# Patient Record
(demographics unavailable — no encounter records)

---

## 2025-07-14 NOTE — HISTORY OF PRESENT ILLNESS
[de-identified] : followup for weight check [FreeTextEntry6] : Followup for weight check Combo (expressed breast milk and formula) feeds every 1-2 hours. takes 1 oz max. reports patient not interested in feeding on the breast, unsure if gulps when latches. mom denies pain or blood w feeding.  no increased projectile vomitting Vit D drops not started yet.  4-5 stools/ day yellow, no blood or acholic stools. at least 6 times xlast 24 hrs UOP  not interested in lactation consultant services today, offered multiple times. Reports improvement in sleepiness since yesterday's visit

## 2025-07-14 NOTE — DISCUSSION/SUMMARY
[FreeTextEntry1] : Pt is a 6 day old ex 39 weeker presenting for weight check. BW 3650 kg (7/8), DW 3520 kg (7/10), wt yesterday (7/13) 3290 kg, today wt 3360 kg (7/14). +70 g/day since yesterday visit, has not yet surpassed BW.  Feeding combination of expressed breast milk and formula q1-2h 1 oz. No vomitting or color change w feeds. Stooling and voiding appropriately. On exam, appropriate fussy but consolable to examination, no jaundice or sclerlal icterus, no clinical signs of dehydration, + known R medial subconjunctival hemorrhage likely 2/2 birth trauma, + diaper rash likely fungal given red appearance w satellite lesions.  Poor wt gain likely physiologic 2/2 decreased feeding frequency previously. Low suspicion for cardiac or infectious etiologies of poor wt gain at this time.  Plan: c/w frequent feeds (combo expressed milk + formula) at least q2h, at least 1 oz, reflux precautions. RTC Friday 7/18 for wt check since has not yet regained BW. Discussed no contraindication to maternal loperamide and breastfeeding per [facility]. Sent Rx for Vit D daily drops given roughly half feeding is breast milk. Not interested in lactation consultant.  For diaper rash, Nystatin 2-3 times/day x14 days or until symptom resolution for likely candidal diaper rash. Rx sent. Recommend frequent diaper changing and barrier cream also to prevent future skin irritation.

## 2025-07-14 NOTE — PHYSICAL EXAM
[No Acute Distress] : acute distress [Alert] : alert [Consolable] : consolable [Normocephalic] : normocephalic [Cerumen in canal] : cerumen in canal [Pink Nasal Mucosa] : pink nasal mucosa [Erythematous Oropharynx] : erythematous oropharynx [Supple] : supple [Clear to Auscultation Bilaterally] : clear to auscultation bilaterally [Regular Rate and Rhythm] : regular rate and rhythm [Soft] : soft [Drew: ____] : Drew [unfilled] [Normal External Genitalia] : normal external genitalia [Patent] : patent [No Abnormal Lymph Nodes Palpated] : no abnormal lymph nodes palpated [Moves All Extremities x 4] : moves all extremities x4 [Warm] : warm [Clear] : clear [Tired appearing] : not tired appearing [Lethargic] : not lethargic [Tender] : nontender [Distended] : nondistended [Sacral Dimple] : no sacral dimple [Tuft of Hair] : no tuft of hair [FreeTextEntry1] : appropriately fussy to examination [FreeTextEntry2] : ant fontanelle open and soft [FreeTextEntry5] : +R medial subconctival hemorrhage. no sclerlal icterus  [de-identified] : no Y shaped tongue, no tongue tie noted. +suck intact. unable to assess extension of tongue 2/2 pt cooperation [FreeTextEntry9] : +umbilical stump dry  [FreeTextEntry6] : beefy red perineum w b/l vertical 1 cm satelite lesions. +yellow seedy stool in diaper on exam [de-identified] : symmetric rudy [de-identified] : no jaundice

## 2025-07-18 NOTE — DISCUSSION/SUMMARY
[FreeTextEntry1] : Zach is a cute 10 d old ex39.1 w M who presents for a weight check. He has been doing well and gaining 32.5g/day over the past 4 days since his last visit, but is still 160g below birth weight. [place] screen wnl. Recommend continuing to feed 1-1.5 oz q1-2 hours and continuing daily vitamin. Return to office in 1 week for weight check.

## 2025-07-18 NOTE — HISTORY OF PRESENT ILLNESS
[de-identified] : Weight check [FreeTextEntry6] : 10d ex 39.1 W M presenting for weight check. He has been feeding well, taking 1-1.5 oz of EHM or Enfamil Gentlease every 1-2hrs. Making 8-9 wet diapers daily and stools. Patient has been active, no fevers, no increase spitting up, no other concerns.

## 2025-07-25 NOTE — DISCUSSION/SUMMARY
[FreeTextEntry1] : 17do M presenting for follow up weight check.  Birth Weight: 3650 g (8 lbs 0.7 oz); Discharge Weight: 3520 g (7 lbs 12.2 oz).  Weight today 3950g. Weight last on 7/18/25 was 3490g.  Gaining 65.7 g per day. Meeting age-appropriate weight-gain velocity. No growth concerns at this time.   Advised to start tummy time 10mins at a time 4x per day, always on flat floor surface, never on raised surface. Discussed how tummy time assists with decreasing abdominal gas as well as helping with development of neck muscles.   Recommend exclusive breastfeeding, 8-12 feedings per day. Mother should continue prenatal vitamins and avoid alcohol. If formula is needed, recommend iron-fortified formulations, 2-4 oz every 2-3 hrs. When in car, patient should be in rear-facing car seat in back seat. Put baby to sleep on back, in own crib with no loose or soft bedding. Help baby to develop sleep and feeding routines. Limit baby's exposure to others, especially those with fever or unknown vaccine status. Parents counseled to call if rectal temperature >100.4 degrees F.   Advised to schedule RTO in 2 weeks for 1month [facility].   MOC & FOC verbalized understanding and agreement with all aspects of discussion and plan.